# Patient Record
Sex: FEMALE | Race: BLACK OR AFRICAN AMERICAN
[De-identification: names, ages, dates, MRNs, and addresses within clinical notes are randomized per-mention and may not be internally consistent; named-entity substitution may affect disease eponyms.]

---

## 2017-04-14 ENCOUNTER — HOSPITAL ENCOUNTER (EMERGENCY)
Dept: HOSPITAL 18 - NAV ERS | Age: 72
Discharge: HOME | End: 2017-04-14
Payer: MEDICARE

## 2017-04-14 DIAGNOSIS — K21.9: ICD-10-CM

## 2017-04-14 DIAGNOSIS — K08.89: Primary | ICD-10-CM

## 2017-04-14 DIAGNOSIS — Z79.899: ICD-10-CM

## 2017-04-14 DIAGNOSIS — E11.9: ICD-10-CM

## 2017-04-14 DIAGNOSIS — I10: ICD-10-CM

## 2017-04-14 PROCEDURE — 99283 EMERGENCY DEPT VISIT LOW MDM: CPT

## 2018-04-23 ENCOUNTER — HOSPITAL ENCOUNTER (INPATIENT)
Dept: HOSPITAL 92 - 2NO | Age: 73
LOS: 2 days | Discharge: HOME | DRG: 305 | End: 2018-04-25
Attending: FAMILY MEDICINE | Admitting: FAMILY MEDICINE
Payer: MEDICARE

## 2018-04-23 VITALS — BODY MASS INDEX: 41.3 KG/M2

## 2018-04-23 DIAGNOSIS — I08.1: ICD-10-CM

## 2018-04-23 DIAGNOSIS — I10: ICD-10-CM

## 2018-04-23 DIAGNOSIS — I16.0: Primary | ICD-10-CM

## 2018-04-23 DIAGNOSIS — E66.9: ICD-10-CM

## 2018-04-23 DIAGNOSIS — R01.1: ICD-10-CM

## 2018-04-23 DIAGNOSIS — E11.9: ICD-10-CM

## 2018-04-23 LAB
ALBUMIN SERPL BCG-MCNC: 4.1 G/DL (ref 3.4–4.8)
ALP SERPL-CCNC: 86 U/L (ref 40–150)
ALT SERPL W P-5'-P-CCNC: 8 U/L (ref 8–55)
ANION GAP SERPL CALC-SCNC: 13 MMOL/L (ref 10–20)
AST SERPL-CCNC: 14 U/L (ref 5–34)
BASOPHILS # BLD AUTO: 0.1 THOU/UL (ref 0–0.2)
BASOPHILS NFR BLD AUTO: 0.7 % (ref 0–1)
BILIRUB SERPL-MCNC: 0.5 MG/DL (ref 0.2–1.2)
BUN SERPL-MCNC: 14 MG/DL (ref 9.8–20.1)
CALCIUM SERPL-MCNC: 9.9 MG/DL (ref 7.8–10.44)
CHLORIDE SERPL-SCNC: 109 MMOL/L (ref 98–107)
CO2 SERPL-SCNC: 25 MMOL/L (ref 23–31)
CREAT CL PREDICTED SERPL C-G-VRATE: 110 ML/MIN (ref 70–130)
EOSINOPHIL # BLD AUTO: 0.4 THOU/UL (ref 0–0.7)
EOSINOPHIL NFR BLD AUTO: 3.4 % (ref 0–10)
GLOBULIN SER CALC-MCNC: 3.4 G/DL (ref 2.4–3.5)
GLUCOSE SERPL-MCNC: 106 MG/DL (ref 80–115)
HGB BLD-MCNC: 12.8 G/DL (ref 12–16)
LYMPHOCYTES # BLD: 3.7 THOU/UL (ref 1.2–3.4)
LYMPHOCYTES NFR BLD AUTO: 35.4 % (ref 21–51)
MCH RBC QN AUTO: 28.6 PG (ref 27–31)
MCV RBC AUTO: 88.2 FL (ref 81–99)
MONOCYTES # BLD AUTO: 0.8 THOU/UL (ref 0.11–0.59)
MONOCYTES NFR BLD AUTO: 7.8 % (ref 0–10)
NEUTROPHILS # BLD AUTO: 5.5 THOU/UL (ref 1.4–6.5)
NEUTROPHILS NFR BLD AUTO: 52.7 % (ref 42–75)
PLATELET # BLD AUTO: 280 THOU/UL (ref 130–400)
POTASSIUM SERPL-SCNC: 3.9 MMOL/L (ref 3.5–5.1)
RBC # BLD AUTO: 4.47 MILL/UL (ref 4.2–5.4)
SODIUM SERPL-SCNC: 143 MMOL/L (ref 136–145)
TROPONIN I SERPL DL<=0.01 NG/ML-MCNC: (no result) NG/ML (ref ?–0.03)
TROPONIN I SERPL DL<=0.01 NG/ML-MCNC: (no result) NG/ML (ref ?–0.03)
TROPONIN I SERPL DL<=0.01 NG/ML-MCNC: 0.01 NG/ML (ref ?–0.03)
WBC # BLD AUTO: 10.5 THOU/UL (ref 4.8–10.8)

## 2018-04-23 PROCEDURE — A9500 TC99M SESTAMIBI: HCPCS

## 2018-04-23 PROCEDURE — A4216 STERILE WATER/SALINE, 10 ML: HCPCS

## 2018-04-23 PROCEDURE — 93010 ELECTROCARDIOGRAM REPORT: CPT

## 2018-04-23 PROCEDURE — 93005 ELECTROCARDIOGRAM TRACING: CPT

## 2018-04-23 PROCEDURE — 85025 COMPLETE CBC W/AUTO DIFF WBC: CPT

## 2018-04-23 PROCEDURE — 80061 LIPID PANEL: CPT

## 2018-04-23 PROCEDURE — 93017 CV STRESS TEST TRACING ONLY: CPT

## 2018-04-23 PROCEDURE — 80053 COMPREHEN METABOLIC PANEL: CPT

## 2018-04-23 PROCEDURE — 83036 HEMOGLOBIN GLYCOSYLATED A1C: CPT

## 2018-04-23 PROCEDURE — 78452 HT MUSCLE IMAGE SPECT MULT: CPT

## 2018-04-23 PROCEDURE — 84443 ASSAY THYROID STIM HORMONE: CPT

## 2018-04-23 PROCEDURE — 36415 COLL VENOUS BLD VENIPUNCTURE: CPT

## 2018-04-23 PROCEDURE — 84484 ASSAY OF TROPONIN QUANT: CPT

## 2018-04-23 PROCEDURE — 83880 ASSAY OF NATRIURETIC PEPTIDE: CPT

## 2018-04-23 PROCEDURE — 93306 TTE W/DOPPLER COMPLETE: CPT

## 2018-04-23 PROCEDURE — 71045 X-RAY EXAM CHEST 1 VIEW: CPT

## 2018-04-23 PROCEDURE — 36416 COLLJ CAPILLARY BLOOD SPEC: CPT

## 2018-04-23 RX ADMIN — Medication SCH ML: at 21:24

## 2018-04-23 NOTE — PDOC.FPRHP
- History of Present Illness


Chief Complaint: headache


History of Present Illness: 





Patient is a direct admit from clinic where she presented with HA and BP >200/

110. She has PMH including uncontrolled HTN usually running between 150-180 

systolic and DM2. Patient states that she got a headache in the back of her 

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 

class. 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 

high so she drove to the clinic and was sent in for direct admit. She has had 

increasing dyspnea with exertion, usually able to walk around the track but now 

tired walking to the track. She has had orthopnea, sleeps on 10 pillows. She 

states she has a "funny feeling" in her chest but not pressure or stabbing. 


ED Course: 





direct admit from clinic





- Allergies/Adverse Reactions


 Allergies











Allergy/AdvReac Type Severity Reaction Status Date / Time


 


Penicillins Allergy   Verified 04/23/18 16:03














- Home Medications


 











 Medication  Instructions  Recorded  Confirmed  Type


 


Lisinopril 40 mg PO HS 04/23/18 04/23/18 History


 


diphenhydrAMINE [Benadryl] 25 mg PO HS PRN 04/23/18 04/23/18 History


 


metFORMIN HCl [Metformin HCl] 500 mg PO HS 04/23/18 04/23/18 History














- History


PMHx:HTN, DM2


 


PSHx: ankle 1987





FHx: non-contributory


 


Social: >36 years ago 1 pack per week smoker for 10 years, heavy drinker in her 

20s none since mid 30s, no drugs


 








- Review of Systems


General: reports: fatigue.  denies: fever/chills


Eyes: denies: eye pain, vision changes


ENT: reports: nasal congestion.  denies: rhinorrhea


Respiratory: reports: shortness of breath, exercise intolerance.  denies: cough

, congestion


Cardiovascular: reports: chest pain.  denies: palpitation


Gastrointestinal: denies: nausea, vomiting, diarrhea, constipation, abdominal 

pain, GI bleeding


Genitourinary: denies: incontinence, dysuria


Skin: denies: rashes, itching


Musculoskeletal: denies: pain, swelling


Neurological: reports: other (headache, see hpi).  denies: numbness, weakness


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





- Vital signs


BP: 207/104  HR: 78 RR: 20 Tmax: 98.2 Pox: 97% on RA  Wt: 111.82   








- Physical Exam


Constitutional: NAD, awake, alert and oriented


HEENT: normocephalic and atraumatic, PERRLA


Neck: supple, trachea midline


Heart: RRR, normal S1/S2


-Heart: 





2/6 systolic murmur


Lungs: CTAB, no respiratory distress, no wheezing


Abdomen: soft, non-tender, bowel sounds present, no masses/distention


Musculoskeletal: normal structure, ROM grossly normal


Neurological: no focal deficit, CN II-XII intact


Skin: no rash/lesions, capillary refill <2 seconds


Heme/Lymphatic: no unusual bruising or bleeding


Psychiatric: normal mood and affect





FMR H&P: Results





- Labs


Result Diagrams: 


 04/23/18 16:36





 04/23/18 16:36





FMR H&P: A/P





- Problem List


(1) Hypertensive urgency


Current Visit: Yes   Status: Acute   Code(s): I16.0 - HYPERTENSIVE URGENCY   





(2) DM2 (diabetes mellitus, type 2)


Current Visit: Yes   Status: Acute   





(3) Headache


Current Visit: Yes   Status: Acute   Code(s): R51 - HEADACHE   





(4) Chest pain, rule out acute myocardial infarction


Current Visit: Yes   Status: Acute   Code(s): R07.9 - CHEST PAIN, UNSPECIFIED   





- Plan





# Hypertensive Urgency


- sbp >200 on admission


- PRN hydralazine 10 mg q 30min


- lisinopril 40 from home


- added amlodipine 5mg, chlorthaldione 25mg


- cmp, cbc, TSH





# Chest pain r/o


- ekg shows non-specific t-wave inversions


- no chest pain


- trend trop





# Exertional Dyspnea


- possibly 2/2 to above


- echo to rule out heart failure


- bnp





# DM2


- metformin 500 per home


- check A1c


- Ac-HS accucheck





fluids: none


diet: regular


code: full


dispo: 1-2 days pending cardiac workup





FMR H&P: Upper Level





- Pertinent history


72 female who presents with headache and elevated HTN above baseline.  She also 

had easy dyspnea with exertion and fatigue starting this weekend, and her blood 

pressure was elevated.  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 

she was found to be 200s/100s.  chest tightness more than pain.  She has been 

trying to get her blood pressure controlled with oral medications prescribed by 

pcp for the last few months.  








- Pertinent findings


Gen:  obese female in no acute distress, Alert and orientedx4 


HEENT:  NC/AT, FIONA,EOMI, MMM


Resp: CTA, normal work of breathing


CV: RRR, normal S1, S2, no murmur


ABD:  Soft nontender, nondistended. 


Extremities: no edema or chronic skin changes.    


Psych:  Patient is following commands, but is otherwise blank


Neuro: CN exam normal. Strength and sensation intact.    








- Plan


Date/Time: 04/23/18 1625





IJULY, have evaluated this patient and agree with findings/plan as 

outlined by intern resident. Pertinent changes/additions are listed here.





1. Hypertensive urgency-  Patient developing several concering symptoms that 

could be related to these high blood pressures.  We will attempt to restore 

patient back to her recent baseline of less than 150-180 systolic and also 

check for signs of end organ damage.  hydralazine acutely but will also add 

chlorthalidone and amlodipine for long term control.  ECG related to these 

pressures showed NST changes and some T wave inversion.  Will trend troponins 

and repeat ekg if needed.  Will get echo to evaluate for dyspnea.  order CBC, 

CMP, TSH


2. DM2- accuchecks, A1c, and home metformin.











Attending Addendum





- Attending Addendum


Date/Time: 04/23/18 2129





I personally evaluated the patient and discussed the management with Dr. Wright 

and Dr. Roper


I agree with the History, Examination, Assessment and Plan documented above 

with any addition or exceptions noted below.





71 yo female with multiple medical conditions admitted for HTN urgency.


Patient reports several days of elevated BP and headaches. 


VS reviewed. 


Labs reviewed. 


Imaging reviewed. 





1. HTN urgency: IV antihypertensives as needed. Decrease by 20%. Restart home 

meds. Consider additional medications to improve home goal. Trend labs and EKG. 

Strain pattern and repolarization changes noted. 


Adjust home meds as indicated. 





Blossom

## 2018-04-23 NOTE — RAD
CHEST ONE VIEW:

 

HISTORY:

Hypertension.

 

COMPARISON: 

11/10/2013

 

FINDINGS:

The cardiac silhouette is magnified by projection.  The pulmonary vasculature is at the upper limits 
of normal.  The mediastinum is midline.  There is no lobar consolidation or evidence of pneumothorax.


 

IMPRESSION:

No active cardiopulmonary abnormalities demonstrated.

 

POS: GAGANH

## 2018-04-24 LAB
CHD RISK SERPL-RTO: 3.6 (ref ?–4.5)
CHOLEST SERPL-MCNC: 212 MG/DL
HDLC SERPL-MCNC: 59 MG/DL
LDLC SERPL CALC-MCNC: 113 MG/DL
TRIGL SERPL-MCNC: 201 MG/DL (ref ?–150)
TROPONIN I SERPL DL<=0.01 NG/ML-MCNC: 0.01 NG/ML (ref ?–0.03)

## 2018-04-24 RX ADMIN — Medication SCH ML: at 21:08

## 2018-04-24 RX ADMIN — Medication SCH ML: at 09:17

## 2018-04-24 NOTE — EKG
Test Reason : 

Blood Pressure : ***/*** mmHG

Vent. Rate : 068 BPM     Atrial Rate : 068 BPM

   P-R Int : 124 ms          QRS Dur : 078 ms

    QT Int : 360 ms       P-R-T Axes : 055 -12 -66 degrees

   QTc Int : 382 ms

 

Normal sinus rhythm

Moderate voltage criteria for LVH, may be normal variant





Abnormal ECG

Confirmed by KAREN WU ([id number]) on 4/24/2018 3:41:51 PM

 

Referred By:  FRACISCO           Confirmed By:KAREN WU

## 2018-04-24 NOTE — EKG
Test Reason : STAT

Blood Pressure : ***/*** mmHG

Vent. Rate : 091 BPM     Atrial Rate : 091 BPM

   P-R Int : 132 ms          QRS Dur : 080 ms

    QT Int : 344 ms       P-R-T Axes : 071 -13 -53 degrees

   QTc Int : 423 ms

 

Normal sinus rhythm

Possible Left atrial enlargement

Moderate voltage criteria for LVH, may be normal variant

Cannot rule out Septal infarct , age undetermined

Abnormal ECG

Confirmed by KAREN WU ([id number]) on 4/24/2018 3:44:26 PM

 

Referred By:  FRACISCO           Confirmed By:KAREN WU

## 2018-04-24 NOTE — PDOC.FM
- Subjective


Subjective: 





Patient reports having persistent HA, somewhat improved from yesterday. She 

does report that yesterday evening when she had a normal reading <120/80 her HA 

was resolved with no side effects such as dizziness or light headedness. Denies 

SOB, CP, N/V. 





- Objective


MAR Reviewed: Yes


Vital Signs & Weight: 


 Vital Signs (12 hours)











  Temp Pulse Resp BP BP Pulse Ox


 


 04/24/18 07:40  97.7 F  75  18   169/74 H  96


 


 04/24/18 04:40  98.9 F  86  20   189/84 H 


 


 04/24/18 03:56   94    


 


 04/24/18 00:48   94    


 


 04/24/18 00:30      188/81 H 


 


 04/24/18 00:00  99.0 F  94  20   179/79 H  95


 


 04/23/18 21:23     110/54 L  








 Weight











Weight                         110.178 kg














I&O: 


 











 04/23/18 04/24/18 04/25/18





 06:59 06:59 06:59


 


Intake Total  300 


 


Balance  300 











Result Diagrams: 


 04/23/18 16:36





 04/23/18 16:36





<Billy Choi M - Last Filed: 04/24/18 08:59>





- Objective


Vital Signs & Weight: 


 Vital Signs (12 hours)











  Temp Pulse Resp BP BP Pulse Ox


 


 04/24/18 21:07     167/75 H  


 


 04/24/18 17:23      167/86 H 


 


 04/24/18 15:35  98.7 F  99  16   179/80 H  95


 


 04/24/18 14:23   88   175/80 H  


 


 04/24/18 14:18   88    175/80 H 


 


 04/24/18 13:37   83    182/82 H 


 


 04/24/18 13:00   79   205/99 H  


 


 04/24/18 12:55  99.1 F  79  18   205/99 H  97








 Weight











Weight                         110.178 kg














I&O: 


 











 04/23/18 04/24/18 04/25/18





 06:59 06:59 06:59


 


Intake Total  300 760


 


Balance  300 760











Result Diagrams: 


 04/23/18 16:36





 04/23/18 16:36





<Irene Eckert Jessica - Last Filed: 04/24/18 22:08>





Phys Exam





- Physical Examination


Constitutional: NAD


HEENT: PERRLA, moist MMs


Respiratory: no wheezing, clear to auscultation bilateral


Cardiovascular: RRR


2/6 systolic murmur over right sternal border


Gastrointestinal: soft, non-tender


Musculoskeletal: no edema, pulses present


Neurological: normal sensation, moves all 4 limbs


Psychiatric: normal affect, A&O x 3


Skin: no rash, normal turgor





<Billy Choi - Last Filed: 04/24/18 08:59>





Dx/Plan


(1) Hypertensive urgency


Code(s): I16.0 - HYPERTENSIVE URGENCY   Status: Acute   


Plan: 


Patient has improved on this morning's BP, though still in an elevated range. 


Patient started on chlorthalidone and amlodipine last night. Continue with 

these meds and continue on lisinopril. 


Monitor BP this morning, possible d/c later today depending on ECHO and BP. 








(2) Systolic murmur


Code(s): R01.1 - CARDIAC MURMUR, UNSPECIFIED   Status: Acute   


Plan: 


ECHO pending








(3) DM2 (diabetes mellitus, type 2)


Status: Acute   





(4) Headache


Code(s): R51 - HEADACHE   Status: Acute   





<Billy Choi - Last Filed: 04/24/18 08:59>





Attending Addendum





- Attending Addendum


Date/Time: 04/24/18 1025





I personally evaluated the patient and discussed the management with Dr. Choi


I agree with the History, Examination, Assessment and Plan documented above 

with any addition or exceptions noted below.


Hypertensive Emergency- improving.  Will continue to slowly increase meds.


Non-specific ST changes on EKG- will do stress test to further evaluate. 








<Irene Eckert - Last Filed: 04/24/18 22:08>

## 2018-04-25 VITALS — DIASTOLIC BLOOD PRESSURE: 69 MMHG | TEMPERATURE: 97.8 F | SYSTOLIC BLOOD PRESSURE: 127 MMHG

## 2018-04-25 RX ADMIN — Medication SCH: at 08:15

## 2018-04-25 NOTE — PDOC.FM
- Subjective


Subjective: 





Patient reports doing well overnight, no HA overnight. Denies CP, SOB, N/V. 





- Objective


MAR Reviewed: Yes


Vital Signs & Weight: 


 Vital Signs (12 hours)











  Temp Pulse Resp BP BP Pulse Ox


 


 04/25/18 08:00  97.8 F  83  18  127/69   96


 


 04/25/18 04:00  99.9 F H  104 H  17   145/93 H  96








 Weight











Weight                         110.178 kg














I&O: 


 











 04/24/18 04/25/18 04/26/18





 06:59 06:59 06:59


 


Intake Total 300 760 


 


Balance 300 760 











Result Diagrams: 


 04/23/18 16:36





 04/23/18 16:36





<Billy Choi - Last Filed: 04/25/18 09:56>





- Objective


Vital Signs & Weight: 


 Vital Signs (12 hours)











  Temp Pulse Resp BP BP Pulse Ox


 


 04/25/18 08:00  97.8 F  83  18  127/69   96


 


 04/25/18 04:00  99.9 F H  104 H  17   145/93 H  96








 Weight











Weight                         110.178 kg














I&O: 


 











 04/24/18 04/25/18 04/26/18





 06:59 06:59 06:59


 


Intake Total 300 760 


 


Balance 300 760 











Result Diagrams: 


 04/23/18 16:36





 04/23/18 16:36





<Gurpreet Ceja - Last Filed: 04/25/18 13:50>





Phys Exam





- Physical Examination


Constitutional: NAD


HEENT: moist MMs, oral pharynx no lesions


Neck: no JVD, full ROM


Respiratory: no wheezing, clear to auscultation bilateral


Cardiovascular: RRR


2/6 systolic murmur on right sternal border


Gastrointestinal: soft, non-tender


Musculoskeletal: no edema, pulses present


Neurological: normal sensation, moves all 4 limbs


Psychiatric: normal affect, A&O x 3


Skin: no rash





<Billy Choi - Last Filed: 04/25/18 09:56>





Dx/Plan


(1) Hypertensive urgency


Code(s): I16.0 - HYPERTENSIVE URGENCY   Status: Acute   


Plan: 


 


Patient on chlorthalidone, lisinopril, and amlodipine.


Monitor BP this morning, possible d/c later today depending on ECHO and BP. 


Discussed with Dr. Serrano and we decided to get stress today.








(2) Systolic murmur


Code(s): R01.1 - CARDIAC MURMUR, UNSPECIFIED   Status: Acute   


Plan: 


normal








(3) DM2 (diabetes mellitus, type 2)


Status: Acute   





(4) Headache


Code(s): R51 - HEADACHE   Status: Acute   





<Billy Choi - Last Filed: 04/25/18 09:56>





Attending Addendum





- Attending Addendum


Date/Time: 04/25/18 1350





I personally evaluated the patient and discussed the management with Dr. Choi.


I agree with the History, Examination, Assessment and Plan documented above 

with any addition or exceptions noted below.








<Gurpreet Ceja - Last Filed: 04/25/18 13:50>

## 2018-04-25 NOTE — NM
CARDIAC SPECT:

 

HISTORY:

A 72-year-old black female with hypertension, diabetes, shortness of breath, and chest pain.

 

TECHNIQUE:

A stress only myocardial perfusion scan was performed following the intravenous administration of 29 
millicuries of technetium 99m sestamibi.  Pharmacologic stress with adenosine was monitored and inter
preted by AYUSH Lorenzana.

 

FINDINGS:

Homogeneous tracer distribution is seen in the myocardial segments on the post stress images.

 

GATED SPECT LVEF:

76%

 

WALL MOTION EXAM:

Normal.

 

IMPRESSION:

Normal post stress myocardial perfusion scan.

 

POS: KATIA

## 2018-04-26 NOTE — DIS-2
DATE OF ADMISSION:  04/23/2018

 

DATE OF DISCHARGE:  04/25/2018

 

RESIDENT:  Billy Choi D.O.

 

ADMITTING ATTENDING:  Sintia Ojeda M.D.

 

DISCHARGE ATTENDING:  Gurpreet Ceja M.D.

 

CONSULTS:  None.

 

PROCEDURES:  Echocardiogram on 04/24/2018 showing ejection fraction of 65-70% with mild mitral and tr
icuspid regurgitation.  Nuclear medicine stress test on 04/25/2018, was unremarkable.

 

PRIMARY DIAGNOSIS:  Hypertensive urgency, rule out chest pain.

 

SECONDARY DIAGNOSES:  Hypertension, type 2 diabetes.

 

DISCHARGE MEDICATIONS:

1.  Amlodipine 5 mg p.o. daily.

2.  Chlorthalidone 25 mg p.o. daily.

3.  Metformin 500 mg p.o. bedtime.

4.  Lisinopril 40 mg p.o. at bedtime.

5.  Diphenhydramine 25 mg p.o. at bedtime p.r.n. allergies.

 

DISCONTINUED MEDICATIONS:  None.

 

HISTORY OF PRESENT ILLNESS AND HOSPITAL COURSE:  The patient is a 72-year-old female who presented as
 direct admit from Dr. Serrano's office in [place] for elevated blood pressure in her office for [facility] of atypical chest pain and lower extremity swelling.  Initially, the patient's blood pressure w
as elevated and was treated with p.r.n. hydralazine as well as started on new blood pressure medicati
ons of amlodipine and chlorthalidone and continued on lisinopril.  There were concerns also for CHF. 
 Therefore, echocardiogram was ordered, which was unremarkable.  On day #2 of hospitalization, martínez martinez was taken back for nuclear medicine stress test, which was also normal.  The patient reports throug
[facility] hospitalization that her headaches were only present during occasional levels of elevated blood 
pressure; however, when blood pressure was in an appropriate range, she did not have any issues.  On 
day of discharge, patient had blood pressure of 127/69 prior to discharge.  Therefore, she was told t
o follow up with Dr. Serrano within the next 7 days.

 

Regarding the patient's blood sugars, she remained in an acceptable range of 140-180 during hospitali
zation.

 

Also, of note, the patient's BNP was 41 and troponin was always in a negative range.

 

DISPOSITION:  Stable.

 

DISCHARGE INSTRUCTIONS:

1.  Location:  Home.

2.  Diet:  Diabetic diet.

3.  Activity:  As tolerated.

4.  Followup:  Followup with Dr. Serrano in the next 7 days.